# Patient Record
Sex: MALE | Race: WHITE | Employment: FULL TIME | URBAN - METROPOLITAN AREA
[De-identification: names, ages, dates, MRNs, and addresses within clinical notes are randomized per-mention and may not be internally consistent; named-entity substitution may affect disease eponyms.]

---

## 2019-05-06 ENCOUNTER — OFFICE VISIT (OUTPATIENT)
Dept: FAMILY MEDICINE CLINIC | Age: 34
End: 2019-05-06
Payer: COMMERCIAL

## 2019-05-06 VITALS
OXYGEN SATURATION: 98 % | HEIGHT: 72 IN | DIASTOLIC BLOOD PRESSURE: 78 MMHG | WEIGHT: 257.8 LBS | BODY MASS INDEX: 34.92 KG/M2 | SYSTOLIC BLOOD PRESSURE: 122 MMHG | HEART RATE: 78 BPM

## 2019-05-06 DIAGNOSIS — Z23 NEED FOR DIPHTHERIA-TETANUS-PERTUSSIS (TDAP) VACCINE: ICD-10-CM

## 2019-05-06 DIAGNOSIS — Z00.00 ANNUAL PHYSICAL EXAM: Primary | ICD-10-CM

## 2019-05-06 PROCEDURE — 90471 IMMUNIZATION ADMIN: CPT | Performed by: FAMILY MEDICINE

## 2019-05-06 PROCEDURE — 90715 TDAP VACCINE 7 YRS/> IM: CPT | Performed by: FAMILY MEDICINE

## 2019-05-06 PROCEDURE — 99385 PREV VISIT NEW AGE 18-39: CPT | Performed by: FAMILY MEDICINE

## 2019-05-06 RX ORDER — TIZANIDINE 4 MG/1
1 TABLET ORAL EVERY 6 HOURS PRN
Refills: 1 | COMMUNITY
Start: 2019-04-03 | End: 2020-03-04 | Stop reason: ALTCHOICE

## 2019-05-06 RX ORDER — MELOXICAM 15 MG/1
1 TABLET ORAL DAILY
Refills: 2 | COMMUNITY
Start: 2019-04-03 | End: 2020-03-04 | Stop reason: ALTCHOICE

## 2019-05-06 ASSESSMENT — ENCOUNTER SYMPTOMS
DIARRHEA: 0
BLOOD IN STOOL: 0
SINUS PRESSURE: 0
WHEEZING: 0
SHORTNESS OF BREATH: 0
ABDOMINAL PAIN: 0
EYE DISCHARGE: 0
VOMITING: 0
CHEST TIGHTNESS: 0
RHINORRHEA: 0
CONSTIPATION: 0
COUGH: 0
EYE PAIN: 0

## 2019-05-06 ASSESSMENT — PATIENT HEALTH QUESTIONNAIRE - PHQ9
2. FEELING DOWN, DEPRESSED OR HOPELESS: 0
1. LITTLE INTEREST OR PLEASURE IN DOING THINGS: 0
SUM OF ALL RESPONSES TO PHQ QUESTIONS 1-9: 0
SUM OF ALL RESPONSES TO PHQ QUESTIONS 1-9: 0
SUM OF ALL RESPONSES TO PHQ9 QUESTIONS 1 & 2: 0

## 2019-05-06 NOTE — PATIENT INSTRUCTIONS

## 2019-05-06 NOTE — PROGRESS NOTES
Subjective:      Patient ID: Mishel Kaiser is a 29 y.o. male. HPI  Chief Complaint   Patient presents with    New Patient     Patient is here to establish care with Dr. Jim Mcmillan as a new patient. Here to establish care. Former smoker. Does vape now lowest nicotine dose  Does walk for exercise. Lost 90lbs in last 2 yrs on keto  Will schedule his dental appt  Having baby in august. Needs tdap  Mishel Kaiser is a 29 y.o. male with the following history as recorded in [facility]: There are no active problems to display for this patient. Current Outpatient Medications   Medication Sig Dispense Refill    tiZANidine (ZANAFLEX) 4 MG tablet Take 1 tablet by mouth every 6 hours as needed  1    meloxicam (MOBIC) 15 MG tablet Take 1 tablet by mouth daily  2     No current facility-administered medications for this visit. Allergies: Patient has no known allergies. Past Medical History:   Diagnosis Date    Back pain     Dislocated shoulder     left shoulder    Shoulder pain      Past Surgical History:   Procedure Laterality Date    WISDOM TOOTH EXTRACTION       Family History   Problem Relation Age of Onset    Heart Disease Mother     Alzheimer's Disease Father     Prostate Cancer Father     Dementia Father     Heart Attack Maternal Grandfather     Heart Disease Maternal Grandfather      Social History     Tobacco Use    Smoking status: Former Smoker     Packs/day: 1.50     Years: 10.00     Pack years: 15.00     Types: Cigarettes     Last attempt to quit: 2015     Years since quittin.0    Smokeless tobacco: Never Used   Substance Use Topics    Alcohol use: Yes     Comment: rarely     Vitals:    19 1526   BP: 122/78   Pulse: 78   SpO2: 98%   Weight: 257 lb 12.8 oz (116.9 kg)   Height: 5' 11.5\" (1.816 m)     Body mass index is 35.45 kg/m².      Wt Readings from Last 3 Encounters:   19 257 lb 12.8 oz (116.9 kg)     BP Readings from Last 3 Encounters:   19 122/78        Review of Systems   Constitutional: Negative for fatigue, fever and unexpected weight change. HENT: Negative for congestion, ear discharge, ear pain, hearing loss, rhinorrhea and sinus pressure. Eyes: Negative for pain, discharge and visual disturbance. Respiratory: Negative for cough, chest tightness, shortness of breath and wheezing. Cardiovascular: Negative for chest pain, palpitations and leg swelling. Gastrointestinal: Negative for abdominal pain, blood in stool, constipation, diarrhea and vomiting. Genitourinary: Negative for difficulty urinating, dysuria and hematuria. Musculoskeletal: Negative for arthralgias and joint swelling. Neurological: Negative for dizziness, seizures, syncope and headaches. Hematological: Negative for adenopathy. Does not bruise/bleed easily. Psychiatric/Behavioral: Negative for dysphoric mood and sleep disturbance. The patient is not nervous/anxious. Objective:   Physical Exam   Constitutional: He is oriented to person, place, and time. He appears well-developed and well-nourished. No distress. HENT:   Head: Normocephalic. Right Ear: External ear normal.   Left Ear: External ear normal.   Nose: Nose normal.   Mouth/Throat: Oropharynx is clear and moist. No oropharyngeal exudate. Eyes: Pupils are equal, round, and reactive to light. Conjunctivae and EOM are normal.   Neck: Neck supple. No thyromegaly present. Cardiovascular: Normal rate, regular rhythm, normal heart sounds and intact distal pulses. No murmur heard. Pulmonary/Chest: Effort normal and breath sounds normal. He has no wheezes. Abdominal: Soft. Bowel sounds are normal. He exhibits no distension. There is no tenderness. There is no rebound and no guarding. Musculoskeletal: Normal range of motion. Lymphadenopathy:     He has no cervical adenopathy. Neurological: He is alert and oriented to person, place, and time. No cranial nerve deficit. Skin: Skin is warm. No rash noted.  No erythema. No pallor. Psychiatric: He has a normal mood and affect. His behavior is normal. Judgment and thought content normal.       Assessment:            Plan:      Orders Placed This Encounter   Procedures    Tdap (age 6y and older) IM ([address])    LIPID PANEL     Standing Status:   Future     Standing Expiration Date:   5/6/2020     Order Specific Question:   Is Patient Fasting?/# of Hours     Answer:   12    COMPREHENSIVE METABOLIC PANEL     Standing Status:   Future     Standing Expiration Date:   5/6/2020     Patient Counseling:  --Nutrition: Stressed importance of moderation in sodium/caffeine intake, saturated fat and cholesterol, caloric balance, sufficient intake of fresh fruits, vegetables, fiber, calcium, iron, and 1 mg of folate supplement per day (for females capable of pregnancy). --Exercise: Stressed the importance of regular exercise. --Dental health: Discussed importance of regular tooth brushing, flossing, and dental visits. --Immunizations reviewed. Daily sunscreen recommended. Yearly FSE discussed  --Discussed benefits of screening colonoscopy.             Debbi Ormond BHATT, DO

## 2019-05-22 ENCOUNTER — NURSE ONLY (OUTPATIENT)
Dept: FAMILY MEDICINE CLINIC | Age: 34
End: 2019-05-22
Payer: COMMERCIAL

## 2019-05-22 DIAGNOSIS — Z00.00 ANNUAL PHYSICAL EXAM: ICD-10-CM

## 2019-05-22 LAB
A/G RATIO: 1.6 (ref 1.1–2.2)
ALBUMIN SERPL-MCNC: 4.6 G/DL (ref 3.4–5)
ALP BLD-CCNC: 56 U/L (ref 40–129)
ALT SERPL-CCNC: 36 U/L (ref 10–40)
ANION GAP SERPL CALCULATED.3IONS-SCNC: 11 MMOL/L (ref 3–16)
AST SERPL-CCNC: 23 U/L (ref 15–37)
BILIRUB SERPL-MCNC: 0.7 MG/DL (ref 0–1)
BUN BLDV-MCNC: 14 MG/DL (ref 7–20)
CALCIUM SERPL-MCNC: 9.7 MG/DL (ref 8.3–10.6)
CHLORIDE BLD-SCNC: 104 MMOL/L (ref 99–110)
CHOLESTEROL, TOTAL: 151 MG/DL (ref 0–199)
CO2: 27 MMOL/L (ref 21–32)
CREAT SERPL-MCNC: 1 MG/DL (ref 0.9–1.3)
GFR AFRICAN AMERICAN: >60
GFR NON-AFRICAN AMERICAN: >60
GLOBULIN: 2.8 G/DL
GLUCOSE BLD-MCNC: 96 MG/DL (ref 70–99)
HDLC SERPL-MCNC: 41 MG/DL (ref 40–60)
LDL CHOLESTEROL CALCULATED: 91 MG/DL
POTASSIUM SERPL-SCNC: 4.3 MMOL/L (ref 3.5–5.1)
SODIUM BLD-SCNC: 142 MMOL/L (ref 136–145)
TOTAL PROTEIN: 7.4 G/DL (ref 6.4–8.2)
TRIGL SERPL-MCNC: 97 MG/DL (ref 0–150)
VLDLC SERPL CALC-MCNC: 19 MG/DL

## 2019-05-22 PROCEDURE — 36415 COLL VENOUS BLD VENIPUNCTURE: CPT | Performed by: FAMILY MEDICINE

## 2020-03-04 ENCOUNTER — TELEPHONE (OUTPATIENT)
Dept: FAMILY MEDICINE CLINIC | Age: 35
End: 2020-03-04

## 2020-03-04 ENCOUNTER — OFFICE VISIT (OUTPATIENT)
Dept: FAMILY MEDICINE CLINIC | Age: 35
End: 2020-03-04
Payer: COMMERCIAL

## 2020-03-04 VITALS
HEART RATE: 80 BPM | OXYGEN SATURATION: 98 % | HEIGHT: 72 IN | WEIGHT: 298.2 LBS | BODY MASS INDEX: 40.39 KG/M2 | DIASTOLIC BLOOD PRESSURE: 64 MMHG | SYSTOLIC BLOOD PRESSURE: 100 MMHG

## 2020-03-04 PROCEDURE — G8482 FLU IMMUNIZE ORDER/ADMIN: HCPCS | Performed by: FAMILY MEDICINE

## 2020-03-04 PROCEDURE — G8427 DOCREV CUR MEDS BY ELIG CLIN: HCPCS | Performed by: FAMILY MEDICINE

## 2020-03-04 PROCEDURE — 99213 OFFICE O/P EST LOW 20 MIN: CPT | Performed by: FAMILY MEDICINE

## 2020-03-04 PROCEDURE — 1036F TOBACCO NON-USER: CPT | Performed by: FAMILY MEDICINE

## 2020-03-04 PROCEDURE — G8417 CALC BMI ABV UP PARAM F/U: HCPCS | Performed by: FAMILY MEDICINE

## 2020-03-04 ASSESSMENT — PATIENT HEALTH QUESTIONNAIRE - PHQ9
SUM OF ALL RESPONSES TO PHQ QUESTIONS 1-9: 0
SUM OF ALL RESPONSES TO PHQ QUESTIONS 1-9: 0
1. LITTLE INTEREST OR PLEASURE IN DOING THINGS: 0
2. FEELING DOWN, DEPRESSED OR HOPELESS: 0
SUM OF ALL RESPONSES TO PHQ9 QUESTIONS 1 & 2: 0

## 2020-03-04 NOTE — PATIENT INSTRUCTIONS

## 2020-03-04 NOTE — PROGRESS NOTES
Subjective:      Patient ID: Marcial Baird is a 36 y.o. male. HPI  Chief Complaint   Patient presents with    Testicle Pain     pt states that he thinks he has testicular torsion, had a dull but constant pain on the left side but it was not the testicle itself, no swelling. has been going on for a few days, but is not as sore today. Here complaint of left testicular pain for the last 3 days. Denies any trauma or injury. Denies any swelling  Denies any pain with urinating no discharge. Denies any redness or noticeable increase in size on the left side. States is worse when he sits down. Today he is not having it right now but describes it as an ache in the left side. Family history of testicular cancer    Vitals:    03/04/20 1144   BP: 100/64   Pulse: 80   SpO2: 98%   Weight: 298 lb 3.2 oz (135.3 kg)   Height: 5' 11.5\" (1.816 m)     Body mass index is 41.01 kg/m². Wt Readings from Last 3 Encounters:   03/04/20 298 lb 3.2 oz (135.3 kg)   05/06/19 257 lb 12.8 oz (116.9 kg)     BP Readings from Last 3 Encounters:   03/04/20 100/64   05/06/19 122/78        Review of Systems   Constitutional: Negative for chills and fatigue. Genitourinary: Positive for testicular pain. Negative for difficulty urinating, discharge, dysuria, frequency, genital sores, penile pain, penile swelling and scrotal swelling. Objective:   Physical Exam  Constitutional:       Appearance: Normal appearance. Genitourinary:     Penis: Normal.       Scrotum/Testes: Normal.      Comments: uncircumsized  No scrotal mass felt. No ttp   No inguinal hernia felt  Neurological:      Mental Status: He is alert.          Assessment:      Left testicular pain- check US      Plan:      Orders Placed This Encounter   Procedures    US SCROTUM AND TESTICLES     Standing Status:   Future     Standing Expiration Date:   3/4/2021     Order Specific Question:   Reason for exam:     Answer:   left testicular pain             Frankey Bing AGATHA, DO

## 2020-03-05 ENCOUNTER — HOSPITAL ENCOUNTER (OUTPATIENT)
Dept: ULTRASOUND IMAGING | Age: 35
Discharge: HOME | End: 2020-03-05
Payer: COMMERCIAL

## 2020-03-05 PROCEDURE — 76870 US EXAM SCROTUM: CPT
